# Patient Record
Sex: FEMALE | Race: WHITE
[De-identification: names, ages, dates, MRNs, and addresses within clinical notes are randomized per-mention and may not be internally consistent; named-entity substitution may affect disease eponyms.]

---

## 2018-06-06 ENCOUNTER — HOSPITAL ENCOUNTER (OUTPATIENT)
Dept: HOSPITAL 76 - LAB.R | Age: 83
Discharge: HOME | End: 2018-06-06
Attending: INTERNAL MEDICINE
Payer: MEDICARE

## 2018-06-06 DIAGNOSIS — R07.2: Primary | ICD-10-CM

## 2018-06-06 PROCEDURE — 84484 ASSAY OF TROPONIN QUANT: CPT

## 2018-06-27 ENCOUNTER — HOSPITAL ENCOUNTER (OUTPATIENT)
Dept: HOSPITAL 76 - DI | Age: 83
Discharge: HOME | End: 2018-06-27
Attending: INTERNAL MEDICINE
Payer: MEDICARE

## 2018-06-27 DIAGNOSIS — R92.8: ICD-10-CM

## 2018-06-27 DIAGNOSIS — Z80.3: ICD-10-CM

## 2018-06-27 DIAGNOSIS — Z85.3: ICD-10-CM

## 2018-06-27 DIAGNOSIS — Z12.31: Primary | ICD-10-CM

## 2018-06-27 PROCEDURE — 77067 SCR MAMMO BI INCL CAD: CPT

## 2018-06-28 NOTE — MAMMOGRAPHY REPORT
Procedure Date:  06/27/2018   

Accession Number:  [id number] / [id number]                    

Procedure:  FRANTZ - Screening Mammo Dig Bilat CPT Code:  

 

FULL RESULT:

 

 

EXAM: Screening Mammo Dig Bilat

 

DATE: 6/27/2018 1:34 PM

 

CLINICAL HISTORY: 89-year-old with personal history of left breast cancer 

status post lumpectomy and radiation therapy, family history of breast 

cancer for screening.

 

TECHNIQUE: Bilateral CC and MLO views were obtained.

 

COMPARISON: 2/23/2017, 9/10/2015, 5/18/2012

 

FINDINGS:

The breasts demonstrate scattered fibroglandular densities bilaterally. 

Postoperative and posttreatment changes are again seen. Coarse and 

punctate, typically benign calcifications are present. There is a 

possible nodule in the left upper outer central breast. Further 

evaluation with spot compression views and possible ultrasound is 

recommended. No mammographically suspicious findings are appreciated in 

the right breast. No suspicious masses, clustered microcalcifications, or 

regions of architectural distortion are identified.

 

IMPRESSION: Incomplete examination

 

RECOMMENDATION: Additional evaluation as above.

 

BIRADS CATEGORY 0: Incomplete examination

 

STANDARD QUALIFYING STATEMENTS:

1.  This examination was reviewed with the aid of Computer-Aided 

Detection (CAD).

2.  A negative or benign  imaging report should not delay biopsy if 

clinically suspicious findings are present.  Consider surgical 

consultation if warrented.  More than 5% of cancers are not identified by 

imaging.

3.  Dense breasts may obscure an underlying neoplasm.

## 2018-07-03 ENCOUNTER — HOSPITAL ENCOUNTER (EMERGENCY)
Dept: HOSPITAL 76 - ED | Age: 83
LOS: 1 days | Discharge: ANOTHER HOSPITAL | End: 2018-07-04
Payer: MEDICARE

## 2018-07-03 DIAGNOSIS — Z79.82: ICD-10-CM

## 2018-07-03 DIAGNOSIS — R07.2: Primary | ICD-10-CM

## 2018-07-03 DIAGNOSIS — K44.9: ICD-10-CM

## 2018-07-03 DIAGNOSIS — T18.128A: ICD-10-CM

## 2018-07-03 DIAGNOSIS — I10: ICD-10-CM

## 2018-07-03 LAB
ALBUMIN DIAFP-MCNC: 4.3 G/DL (ref 3.2–5.5)
ALBUMIN/GLOB SERPL: 1.1 {RATIO} (ref 1–2.2)
ALP SERPL-CCNC: 75 IU/L (ref 42–121)
ALT SERPL W P-5'-P-CCNC: 22 IU/L (ref 10–60)
ANION GAP SERPL CALCULATED.4IONS-SCNC: 11 MMOL/L (ref 6–13)
AST SERPL W P-5'-P-CCNC: 30 IU/L (ref 10–42)
BASOPHILS NFR BLD AUTO: 0.1 10^3/UL (ref 0–0.1)
BASOPHILS NFR BLD AUTO: 0.9 %
BILIRUB BLD-MCNC: 0.7 MG/DL (ref 0.2–1)
BUN SERPL-MCNC: 20 MG/DL (ref 6–20)
CALCIUM UR-MCNC: 11 MG/DL (ref 8.5–10.3)
CHLORIDE SERPL-SCNC: 97 MMOL/L (ref 101–111)
CO2 SERPL-SCNC: 24 MMOL/L (ref 21–32)
CREAT SERPLBLD-SCNC: 0.9 MG/DL (ref 0.4–1)
EOSINOPHIL # BLD AUTO: 0 10^3/UL (ref 0–0.7)
EOSINOPHIL NFR BLD AUTO: 0.4 %
ERYTHROCYTE [DISTWIDTH] IN BLOOD BY AUTOMATED COUNT: 13.3 % (ref 12–15)
GFRSERPLBLD MDRD-ARVRAT: 59 ML/MIN/{1.73_M2} (ref 89–?)
GLOBULIN SER-MCNC: 3.8 G/DL (ref 2.1–4.2)
GLUCOSE SERPL-MCNC: 110 MG/DL (ref 70–100)
HGB UR QL STRIP: 14.1 G/DL (ref 12–16)
LIPASE SERPL-CCNC: 22 U/L (ref 22–51)
LYMPHOCYTES # SPEC AUTO: 1.2 10^3/UL (ref 1.5–3.5)
LYMPHOCYTES NFR BLD AUTO: 16.4 %
MCH RBC QN AUTO: 31.6 PG (ref 27–31)
MCHC RBC AUTO-ENTMCNC: 34 G/DL (ref 32–36)
MCV RBC AUTO: 93 FL (ref 81–99)
MONOCYTES # BLD AUTO: 0.5 10^3/UL (ref 0–1)
MONOCYTES NFR BLD AUTO: 6.4 %
NEUTROPHILS # BLD AUTO: 5.7 10^3/UL (ref 1.5–6.6)
NEUTROPHILS # SNV AUTO: 7.6 X10^3/UL (ref 4.8–10.8)
NEUTROPHILS NFR BLD AUTO: 75.9 %
PDW BLD AUTO: 7.8 FL (ref 7.9–10.8)
PLATELET # BLD: 232 10^3/UL (ref 130–450)
PROT SPEC-MCNC: 8.1 G/DL (ref 6.7–8.2)
RBC MAR: 4.46 10^6/UL (ref 4.2–5.4)
SODIUM SERPLBLD-SCNC: 132 MMOL/L (ref 135–145)

## 2018-07-03 PROCEDURE — 85025 COMPLETE CBC W/AUTO DIFF WBC: CPT

## 2018-07-03 PROCEDURE — 71046 X-RAY EXAM CHEST 2 VIEWS: CPT

## 2018-07-03 PROCEDURE — 99285 EMERGENCY DEPT VISIT HI MDM: CPT

## 2018-07-03 PROCEDURE — 80053 COMPREHEN METABOLIC PANEL: CPT

## 2018-07-03 PROCEDURE — 83880 ASSAY OF NATRIURETIC PEPTIDE: CPT

## 2018-07-03 PROCEDURE — 96365 THER/PROPH/DIAG IV INF INIT: CPT

## 2018-07-03 PROCEDURE — 96375 TX/PRO/DX INJ NEW DRUG ADDON: CPT

## 2018-07-03 PROCEDURE — 84484 ASSAY OF TROPONIN QUANT: CPT

## 2018-07-03 PROCEDURE — 36415 COLL VENOUS BLD VENIPUNCTURE: CPT

## 2018-07-03 PROCEDURE — 99284 EMERGENCY DEPT VISIT MOD MDM: CPT

## 2018-07-03 PROCEDURE — 96361 HYDRATE IV INFUSION ADD-ON: CPT

## 2018-07-03 PROCEDURE — 93005 ELECTROCARDIOGRAM TRACING: CPT

## 2018-07-03 PROCEDURE — 83690 ASSAY OF LIPASE: CPT

## 2018-07-03 NOTE — XRAY REPORT
Procedure Date:  07/03/2018   

Accession Number:  [id number] / [id number]                    

Procedure:  XR  - Chest 2 View X-Ray CPT Code:  62299

 

FULL RESULT:

 

 

EXAM:

CHEST RADIOGRAPHY

 

EXAM DATE: 7/3/2018 06:34 PM.

 

CLINICAL HISTORY: Chest pain.

 

COMPARISON: 04/03/2018. 08/14/2015.

 

TECHNIQUE: 2 views.

 

FINDINGS:

Lungs/Pleura: Suspect subsegmental bronchiectasis anterior segment left 

lower lobe, unchanged since 08/14/2015. No new focal opacities evident. 

No pleural effusion. No pneumothorax. Normal volumes.

 

Mediastinum: New mild cardiomegaly. No tracheal shift.

 

Other: Stable moderate hiatal hernia.

Remote left breast surgery.

Chronic bilateral rotator cuff tears.

IMPRESSION:

1. Moderate hiatal hernia.

2. New mild cardiomegaly.

3. Probable chronic subsegmental bronchiectasis anterior basilar segment 

left lower lobe.

 

RADIA

## 2018-07-03 NOTE — ED PHYSICIAN DOCUMENTATION
PD HPI DYSPNEA





- Stated complaint


Stated Complaint: CP/NAUSEA





- Chief complaint


Chief Complaint: Cardiac





- History obtained from


History obtained from: Patient





- History of Present Illness


Timing - onset: How many hours ago (6), Today


Timing - onset during: Eating (The patient states she is feeling okay this 

morning without any dyspnea chest pain or other problems.  She has not had any 

recent cough.  She was eating lunch of turkey tetrazine he and states that she 

started eating slightly fast and had an onset of substernal chest pain and 

pressure.  She felt the urge of nausea and attempted vomiting but no food came 

up.  She has had increased pain with any attempts at sipping fluids.  She is 

periodically spitting up saliva.  This continued for several hours and she is 

here now for evaluation.  She has had similarly in the past but has resolved 

within 5 or 10 minutes.)


Timing - duration: Hours (6)


Timing - details: Abrupt onset, Still present


Inciting event(s): No: URI, Allergic rxn/anaphylaxis


Improved by: Other (spitting up saliva)


Worsened by: Other (any attempts of sips of fluid.)


Associated symptoms: Chest pain / discomfort.  No: Fever, Cough, Wheezing





Review of Systems


Constitutional: denies: Fever, Chills


Nose: denies: Rhinorrhea / runny nose, Congestion


Throat: denies: Sore throat


Cardiac: reports: Chest pain / pressure.  denies: Palpitations, Pedal edema, 

Calf pain


Respiratory: denies: Dyspnea, Cough


GI: reports: Nausea, Vomiting (small amounts clear saliva/fluid periodically.)


: denies: Dysuria, Frequency


Skin: denies: Rash, Lesions


Neurologic: reports: Generalized weakness.  denies: Focal weakness, Numbness


Endocrine: reports: Easy bruising / bleeding.  denies: Weight loss


Immunocompromised: denies: Immunocompromised





PD PAST MEDICAL HISTORY





- Past Medical History


Cardiovascular: Hypertension


Respiratory: None


Endocrine/Autoimmune: None


GI: GERD, Hiatal hernia


GYN: Breast cancer


: None


HEENT: Chronic vision loss


Psych: Anxiety


Musculoskeletal: Osteoarthritis


Derm: None





- Past Surgical History


Past Surgical History: Yes


General: Gastric surgery, Colonoscopy, EGD


HEENT: Tonsil/Adenoidectomy





- Present Medications


Home Medications: 


 Ambulatory Orders











 Medication  Instructions  Recorded  Confirmed


 


Aspirin 81 mg PO DAILY 05/15/14 10/29/15


 


Levothyroxine Sodium [Synthroid] 50 mcg PO DAILY 05/15/14 10/29/15


 


Metoprolol Succinate 25 mg PO BID 05/15/14 10/29/15


 


Valsartan [Diovan] 160 mg PO DAILY 05/15/14 10/29/15


 


Nitroglycerin 0.4 mg SL ONCE PRN #1 bottle 05/22/15 10/29/15


 


amLODIPine [Norvasc] 5 mg PO ONCE 05/22/15 10/29/15














- Allergies


Allergies/Adverse Reactions: 


 Allergies











Allergy/AdvReac Type Severity Reaction Status Date / Time


 


Iodinated Contrast- Oral and Allergy  Unknown Verified 04/03/17 13:07





IV Dye     


 


pseudoephedrine AdvReac  Rash Verified 04/03/17 13:07














- Social History


Does the pt smoke?: Yes


Smoking Status: Current every day smoker


Does the pt drink ETOH?: No


Does the pt have substance abuse?: No





- Immunizations


Immunizations are current?: Yes





- POLST


Patient has POLST: No





PD ED PE NORMAL





- Vitals


Vital signs reviewed: Yes





- General


General: Alert and oriented X 3, Well developed/nourished, Other (appears 

uncomfortable due to chest pressure substernal. This improves when she spits/

vomitis up some saliva periodically. )





- HEENT


HEENT: Moist mucous membranes, Pharynx benign





- Neck


Neck: Supple, no meningeal sign, No adenopathy





- Cardiac


Cardiac: RRR, No murmur





- Respiratory


Respiratory: Clear bilaterally





- Abdomen


Abdomen: Normal bowel sounds, Soft, Non tender, Non distended





- Female 


Female : Deferred





- Rectal


Rectal: Deferred





- Back


Back: No CVA TTP





- Derm


Derm: Normal color, Warm and dry





- Extremities


Extremities: No deformity, No tenderness to palpate, Normal ROM s pain, No edema

, No calf tenderness / cord





- Neuro


Neuro: Alert and oriented X 3, No motor deficit, Normal speech





- Psych


Psych: Normal mood





Results





- Vitals


Vitals: 


 Vital Signs - 24 hr











  07/03/18 07/03/18 07/03/18





  18:07 18:45 20:00


 


Temperature 37.0 C  


 


Heart Rate 100  66


 


Respiratory 35 H  26 H





Rate   


 


Blood Pressure 168/75 H  161/67 H


 


Blood Pressure  161/79 H 





[Left]   


 


O2 Saturation 87 L  100














  07/03/18





  21:54


 


Temperature 


 


Heart Rate 69


 


Respiratory 16





Rate 


 


Blood Pressure 147/69 H


 


Blood Pressure 





[Left] 


 


O2 Saturation 97








 Oxygen











O2 Source [Without Activity]   Room air


 


O2 Source                      Nasal cannula

















- EKG (time done)


  ** 18:07


Rate: Rate (enter#) (64)


Rhythm: NSR


Axis: Normal


Intervals: Normal [place]


QRS: Normal


Ischemia: Normal ST segments.  No: ST elevation c/w ischemia, ST depression


Compare to prior EKG: Old EKG unavailable





- Labs


Labs: 


 Laboratory Tests











  07/03/18 07/03/18 07/03/18





  18:16 18:30 18:30


 


WBC   7.6 


 


RBC   4.46 


 


Hgb   14.1 


 


Hct   41.4 


 


MCV   93.0 


 


MCH   31.6 H 


 


MCHC   34.0 


 


RDW   13.3 


 


Plt Count   232 


 


MPV   7.8 L 


 


Neut # (Auto)   5.7 


 


Lymph # (Auto)   1.2 L 


 


Mono # (Auto)   0.5 


 


Eos # (Auto)   0.0 


 


Baso # (Auto)   0.1 


 


Absolute Nucleated RBC   0.00 


 


Nucleated RBC %   0.0 


 


Sodium    132 L


 


Potassium    3.7


 


Chloride    97 L


 


Carbon Dioxide    24


 


Anion Gap    11.0


 


BUN    20


 


Creatinine    0.9


 


Estimated GFR (MDRD)    59 L


 


Glucose    110 H


 


Calcium    11.0 H


 


Total Bilirubin    0.7


 


AST    30


 


ALT    22


 


Alkaline Phosphatase    75


 


Troponin I   


 


B-Natriuretic Peptide  136 H  


 


Total Protein    8.1


 


Albumin    4.3


 


Globulin    3.8


 


Albumin/Globulin Ratio    1.1


 


Lipase    22














  07/03/18





  18:30


 


WBC 


 


RBC 


 


Hgb 


 


Hct 


 


MCV 


 


MCH 


 


MCHC 


 


RDW 


 


Plt Count 


 


MPV 


 


Neut # (Auto) 


 


Lymph # (Auto) 


 


Mono # (Auto) 


 


Eos # (Auto) 


 


Baso # (Auto) 


 


Absolute Nucleated RBC 


 


Nucleated RBC % 


 


Sodium 


 


Potassium 


 


Chloride 


 


Carbon Dioxide 


 


Anion Gap 


 


BUN 


 


Creatinine 


 


Estimated GFR (MDRD) 


 


Glucose 


 


Calcium 


 


Total Bilirubin 


 


AST 


 


ALT 


 


Alkaline Phosphatase 


 


Troponin I  < 0.04


 


B-Natriuretic Peptide 


 


Total Protein 


 


Albumin 


 


Globulin 


 


Albumin/Globulin Ratio 


 


Lipase 














- Rads (name of study)


  ** chest xray


Radiology: Prelim report reviewed, EMP read contemporaneously (no acute findings

)





PD MEDICAL DECISION MAKING





- ED course


Complexity details: reviewed results, re-evaluated patient (She was given some 

IV fluids along with antiemetic ondansetron.  She is given IV glucagon, 

morphine and then also a sublingual nitroglycerin and attempts for esophageal 

relaxation.  She states the discomfort in her chest was improved.  She is still 

spitting up saliva periodically.  Attempts at oral intake which is sips of 

fluid with some easy gas as well caused increased discomfort substernally and 

she had to vomit up the clear fluids.  There is no food vomited.  She is still 

unable to tolerate more than 6 even sips of water.  It still seems like an 

esophageal impaction.), considered differential, d/w patient, other (Talked 

with [facility], who will arrange transfer to another facility for 

treatment. This ended up being a extended period of time for them to find 

placement and arrange transfer.  During this time the patient is comfortable 

but is still periodically spitting up saliva.  She attempted some sips of water 

again when she thought she felt better but got the chest discomfort and had to 

spit back up the water again.  We will maintain IV fluids and the patient will 

be transferred for gastroenterological intervention.)





- Sepsis Event


Vital Signs: 


 Vital Signs - 24 hr











  07/03/18 07/03/18 07/03/18





  18:07 18:45 20:00


 


Temperature 37.0 C  


 


Heart Rate 100  66


 


Respiratory 35 H  26 H





Rate   


 


Blood Pressure 168/75 H  161/67 H


 


Blood Pressure  161/79 H 





[Left]   


 


O2 Saturation 87 L  100














  07/03/18





  21:54


 


Temperature 


 


Heart Rate 69


 


Respiratory 16





Rate 


 


Blood Pressure 147/69 H


 


Blood Pressure 





[Left] 


 


O2 Saturation 97








 Oxygen











O2 Source [Without Activity]   Room air


 


O2 Source                      Nasal cannula

















Departure





- Departure


Disposition: 02 Transfer Acute Care Hosp


Clinical Impression: 


Chest pain


Qualifiers:


 Chest pain type: precordial pain Qualified Code(s): R07.2 - Precordial pain





Food impaction of esophagus


Qualifiers:


 Encounter type: initial encounter Qualified Code(s): T18.128A - Food in 

esophagus causing other injury, initial encounter





Condition: Stable

## 2018-07-04 VITALS — DIASTOLIC BLOOD PRESSURE: 70 MMHG | SYSTOLIC BLOOD PRESSURE: 140 MMHG
